# Patient Record
Sex: MALE | Race: WHITE | NOT HISPANIC OR LATINO | URBAN - METROPOLITAN AREA
[De-identification: names, ages, dates, MRNs, and addresses within clinical notes are randomized per-mention and may not be internally consistent; named-entity substitution may affect disease eponyms.]

---

## 2019-12-22 ENCOUNTER — EMERGENCY (EMERGENCY)
Facility: HOSPITAL | Age: 39
LOS: 1 days | Discharge: HOME | End: 2019-12-22
Payer: COMMERCIAL

## 2019-12-22 VITALS
TEMPERATURE: 98 F | RESPIRATION RATE: 16 BRPM | OXYGEN SATURATION: 99 % | HEIGHT: 67 IN | SYSTOLIC BLOOD PRESSURE: 119 MMHG | WEIGHT: 179.9 LBS | DIASTOLIC BLOOD PRESSURE: 76 MMHG | HEART RATE: 86 BPM

## 2019-12-22 PROCEDURE — 93971 EXTREMITY STUDY: CPT

## 2019-12-22 PROCEDURE — 93971 EXTREMITY STUDY: CPT | Mod: 26,LT

## 2019-12-22 PROCEDURE — 99284 EMERGENCY DEPT VISIT MOD MDM: CPT

## 2019-12-22 PROCEDURE — 99284 EMERGENCY DEPT VISIT MOD MDM: CPT | Mod: 25

## 2019-12-22 NOTE — ED ADULT NURSE NOTE - NSIMPLEMENTINTERV_GEN_ALL_ED
Implemented All Universal Safety Interventions:  [place] to call system. Call bell, personal items and telephone within reach. Instruct patient to call for assistance. Room bathroom lighting operational. Non-slip footwear when patient is off stretcher. Physically safe environment: no spills, clutter or unnecessary equipment. Stretcher in lowest position, wheels locked, appropriate side rails in place.

## 2019-12-22 NOTE — ED PROVIDER NOTE - PHYSICAL EXAMINATION
L calf swelling w/posterior mid calf midline tenderness. Ecchymosis tracks medially down to the medial arch, medial malleolar area, and medial calf. L calf swelling w/posterior mid calf midline tenderness. Ecchymosis tracks medially down to the medial arch, medial malleolar area, and medial calf. Popliteal, Dp/PT pulses intact 2+ bilaterally.

## 2019-12-22 NOTE — ED PROVIDER NOTE - OBJECTIVE STATEMENT
40 y/o M pt with no significant PMHx and no significant PSHx presents to ED c/o persistent L calf pain and bruising x5 days. 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 Pt states this morning he awoke to swelling and bruising to his calf area going down to his foot. Pt denies fever, chills, numbness, tingling, recent travel, or any other acute complaints. NKDA. 40 y/o M pt with no significant PMHx and no significant PSHx presents to ED c/o persistent L calf pain and bruising x5 days. 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 Has had pain all week to the area. Pt states this morning he awoke to swelling and bruising to his calf area going down to his foot. Pt denies fever, chills, numbness, tingling, recent travel, chest pain, SOB, dark urine or any other acute complaints. NKDA.

## 2019-12-22 NOTE — ED PROVIDER NOTE - CLINICAL SUMMARY MEDICAL DECISION MAKING FREE TEXT BOX
Hx and findings suggestive of partial muscle tear with hematoma. Low suspicion of DVT or cellulitis. Will do US to assess for hematoma. Pt already had Naproxen and Cyclobenzaprine rx from urgent care.

## 2019-12-22 NOTE — ED PROVIDER NOTE - PROGRESS NOTE DETAILS
Us shows no hematoma. Patient most likely had a partial calf muscle tear. Will recommend NSAIDs for pain, avoid any strenuous activity that could exacerbate the pain, cane/crutches as needed for ambulation and ortho follow up within 1 week. Pt is well appearing walking with steady gait, stable for discharge and follow up without fail with medical doctor. I had a detailed discussion with the patient and/or guardian regarding the historical points, exam findings, and any diagnostic results supporting the discharge diagnosis. Pt educated on care and need for follow up. Strict return instructions and red flag signs and symptoms discussed with patient. Questions answered. Pt shows understanding of discharge information and agrees to follow.

## 2019-12-22 NOTE — ED PROVIDER NOTE - PATIENT PORTAL LINK FT
You can access the FollowMyHealth Patient Portal offered by [facility] by registering at the following website: [email or web address]. By joining [email or web address]’s FollowMyHealth portal, you will also be able to view your health information using other applications (apps) compatible with our system.

## 2020-09-19 ENCOUNTER — EMERGENCY (EMERGENCY)
Facility: HOSPITAL | Age: 40
LOS: 1 days | Discharge: HOME | End: 2020-09-19
Attending: EMERGENCY MEDICINE
Payer: SELF-PAY

## 2020-09-19 VITALS
DIASTOLIC BLOOD PRESSURE: 72 MMHG | HEART RATE: 85 BPM | SYSTOLIC BLOOD PRESSURE: 107 MMHG | RESPIRATION RATE: 20 BRPM | TEMPERATURE: 99 F | OXYGEN SATURATION: 99 % | HEIGHT: 67 IN | WEIGHT: 175.05 LBS

## 2020-09-19 PROCEDURE — 99283 EMERGENCY DEPT VISIT LOW MDM: CPT

## 2020-09-19 RX ORDER — CIPROFLOXACIN HCL 0.3 %
2 DROPS OPHTHALMIC (EYE)
Qty: 60 | Refills: 0
Start: 2020-09-19 | End: 2020-09-23

## 2020-09-19 NOTE — ED PROVIDER NOTE - PATIENT PORTAL LINK FT
You can access the FollowMyHealth Patient Portal offered by [facility] by registering at the following website: [email or web address]. By joining [employer]’s FollowMyHealth portal, you will also be able to view your health information using other applications (apps) compatible with our system.

## 2020-09-19 NOTE — ED PROVIDER NOTE - NSFOLLOWUPCLINICS_GEN_ALL_ED_FT
[facility] - Ophthalmology Clinic  Ophthalmology  [address] [place] [address]  Phone: [phone]  Fax:   Follow Up Time:     [facility]  Ophthalmology  [address] [place] [address]  Phone: [phone]  Fax:   Follow Up Time:     [facility] - Ophthalmology  Ophthalmology  [address]  [place] [address]  Phone: [phone]  Fax:   Follow Up Time:

## 2020-09-19 NOTE — ED PROVIDER NOTE - NSFOLLOWUPINSTRUCTIONS_ED_ALL_ED_FT
CONJUNCTIVITIS - AfterCare(R) Instructions(ER/ED)           Conjunctivitis    WHAT YOU NEED TO KNOW:    Conjunctivitis, or pink eye, is inflammation of your conjunctiva. The conjunctiva is a thin tissue that covers the front of your eye and the back of your eyelids. The conjunctiva helps protect your eye and keep it moist. Conjunctivitis may be caused by bacteria, allergies, or a virus. If your conjunctivitis is caused by bacteria, it may get better on its own in about 7 days. Viral conjunctivitis can last up to 3 weeks.     DISCHARGE INSTRUCTIONS:    Return to the emergency department if:   •You have worsening eye pain.       •The swelling in your eye gets worse, even after treatment.       •Your vision suddenly becomes worse or you cannot see at all.      Contact your healthcare provider if:   •You develop a fever and ear pain.      •You have tiny bumps or spots of blood on your eye.      •You have questions or concerns about your condition or care.      Manage your symptoms:   •Apply a cool compress. Wet a washcloth with cold water and place it on your eye. This will help decrease itching and irritation.      •Do not wear contact lenses. They can irritate your eye. Throw away the pair you are using and ask when you can wear them again. Use a new pair of lenses when your healthcare provider says it is okay.       •Avoid irritants. Stay away from smoke filled areas. Shield your eyes from wind and sun.       •Flush your eye. You may need to flush your eye with saline to help decrease your symptoms. Ask for more information on how to flush your eye.       Medicines: Treatment depends on what is causing your conjunctivitis. You maybe given any of the following:  •Allergy medicine helps decrease itchy, red, swollen eyes caused by allergies. It may be given as a pill, eye drops, or nasal spray.      •Antibiotics may be needed if your conjunctivitis is caused by bacteria. This medicine may be given as a pill, eye drops, or eye ointment.      •Take your medicine as directed. Contact your healthcare provider if you think your medicine is not helping or if you have side effects. Tell him or her if you are allergic to any medicine. Keep a list of the medicines, vitamins, and herbs you take. Include the amounts, and when and why you take them. Bring the list or the pill bottles to follow-up visits. Carry your medicine list with you in case of an emergency.      Prevent the spread of conjunctivitis:   •Wash your hands with soap and water often. Wash your hands before and after you touch your eyes. Also wash your hands before you prepare or eat food and after you use the bathroom or change a diaper.      •Avoid allergens. Try to avoid the things that cause your allergies, such as pets, dust, or grass.       •Avoid contact with others. Do not share towels or washcloths. Try to stay away from others as much as possible. Ask when you can return to work or school.       •Throw away eye makeup. The bacteria that caused your conjunctivitis can stay in eye makeup. Throw away mascara and other eye makeup.         © Copyright [employer] 2020           back to top                          © Copyright [employer] 2020

## 2020-09-19 NOTE — ED PROVIDER NOTE - CLINICAL SUMMARY MEDICAL DECISION MAKING FREE TEXT BOX
39 yo M with b/l eye redness. More likely infectious, less likely allergic reaction from dust at work. Will stop tobra, start cipro drops. Pt has private ophtho and he can see them tomorrow. Discussed indications for patient return to ED. Patient understood.

## 2020-09-19 NOTE — ED PROVIDER NOTE - NS ED ROS FT
CONSTITUTIONAL: no fever, no chills   EYES: no visual changes, no eye pain, +redness   ENMT: no nasal congestion, no throat pain  CARDIOVASCULAR: no chest pain, no edema, no palpitations   RESPIRATORY: no shortness of breath, no cough   GASTROINTESTINAL: no abdominal pain, no nausea, no vomiting, no diarrhea, no constipation   GENITOURINARY: no dysuria, no frequency  MUSCULOSKELETAL: no joint pains, no myalgias, no back pain   SKIN: no rashes  NEUROLOGICAL: no weakness, no headache, no dizziness, no slurred speech, no syncope     All other ROS negative except as per HPI

## 2020-09-19 NOTE — ED PROVIDER NOTE - PHYSICAL EXAMINATION
GENERAL: well appearing, no acute distress   HEAD: atraumatic   EYES: b/l injected conjunctiva with clear drainage from both eyes; full visual fields; visual acuity intact   ENT: moist oral mucosa   CARDIAC: RRR, no edema, distal pulses present   RESPIRATORY: lungs CTAB, no increased work of breathing   GASTROINTESTINAL: no abdominal tenderness, no rebound or guarding, bowel sounds presents  GENITOURINARY: no CVA tenderness   MUSCULOSKELETAL: no deformity   NEUROLOGICAL: AAOx3, spontaneous movement of extremities   SKIN: intact   PSYCHIATRIC: cooperative  HEME LYMPH: no lymphadenopathy

## 2020-09-19 NOTE — ED PROVIDER NOTE - OBJECTIVE STATEMENT
41 yo M no pmh presents with b/l eye redness x 1 week, initially L eye then developed into R eye. Wakes up in morning with crusting of eyes. Got dust in eyes at work 1 weeks ago. Has been using daughter's tobramycin eye drops. Denies pain, visual changes, other acute complaints.

## 2022-04-06 NOTE — ED PROVIDER NOTE - TEMPLATE
Orthopedic [Well Developed] : well developed [Well Nourished] : well nourished [No Acute Distress] : no acute distress [Normal Conjunctiva] : normal conjunctiva [Normal Venous Pressure] : normal venous pressure [No Carotid Bruit] : no carotid bruit [Normal S1, S2] : normal S1, S2 [No Murmur] : no murmur [No Rub] : no rub [No Gallop] : no gallop [Clear Lung Fields] : clear lung fields [Good Air Entry] : good air entry [No Respiratory Distress] : no respiratory distress  [Soft] : abdomen soft [Non Tender] : non-tender [No Masses/organomegaly] : no masses/organomegaly [Normal Bowel Sounds] : normal bowel sounds [Normal Gait] : normal gait [No Edema] : no edema [No Cyanosis] : no cyanosis [No Clubbing] : no clubbing [No Varicosities] : no varicosities [No Rash] : no rash [No Skin Lesions] : no skin lesions [Moves all extremities] : moves all extremities [No Focal Deficits] : no focal deficits [Normal Speech] : normal speech [Alert and Oriented] : alert and oriented [Normal memory] : normal memory